# Patient Record
Sex: MALE | Race: OTHER | HISPANIC OR LATINO | URBAN - METROPOLITAN AREA
[De-identification: names, ages, dates, MRNs, and addresses within clinical notes are randomized per-mention and may not be internally consistent; named-entity substitution may affect disease eponyms.]

---

## 2022-07-16 ENCOUNTER — EMERGENCY (EMERGENCY)
Facility: HOSPITAL | Age: 22
LOS: 1 days | Discharge: HOME | End: 2022-07-16
Attending: EMERGENCY MEDICINE
Payer: MEDICAID

## 2022-07-16 VITALS
SYSTOLIC BLOOD PRESSURE: 128 MMHG | TEMPERATURE: 98 F | HEART RATE: 62 BPM | OXYGEN SATURATION: 99 % | DIASTOLIC BLOOD PRESSURE: 76 MMHG | HEIGHT: 68.9 IN | RESPIRATION RATE: 16 BRPM | WEIGHT: 194.01 LBS

## 2022-07-16 PROCEDURE — 73130 X-RAY EXAM OF HAND: CPT

## 2022-07-16 PROCEDURE — 99284 EMERGENCY DEPT VISIT MOD MDM: CPT | Mod: 57

## 2022-07-16 PROCEDURE — 99283 EMERGENCY DEPT VISIT LOW MDM: CPT | Mod: 25

## 2022-07-16 PROCEDURE — 73130 X-RAY EXAM OF HAND: CPT | Mod: 26,LT

## 2022-07-16 PROCEDURE — 26720 TREAT FINGER FRACTURE EACH: CPT | Mod: 54,F4
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
## 2022-07-16 NOTE — ED PROVIDER NOTE - PATIENT PORTAL LINK FT
You can access the FollowMyHealth Patient Portal offered by [facility] by registering at the following website: [email or web address]. By joining [facility] FollowMyHealth portal, you will also be able to view your health information using other applications (apps) compatible with our system.

## 2022-07-16 NOTE — ED PROVIDER NOTE - ATTESTATION, MLM
normal I have reviewed and confirmed nurses' notes for patient's medications, allergies, medical history, and surgical history.
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
## 2022-07-16 NOTE — ED ADULT NURSE NOTE - NSIMPLEMENTINTERV_GEN_ALL_ED
Implemented All Universal Safety Interventions:  [place] to call system. Call bell, personal items and telephone within reach. Instruct patient to call for assistance. Room bathroom lighting operational. Non-slip footwear when patient is off stretcher. Physically safe environment: no spills, clutter or unnecessary equipment. Stretcher in lowest position, wheels locked, appropriate side rails in place.
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
## 2022-12-13 NOTE — ED PROCEDURE NOTE - NS ED PROCEDURE ASSISTED BY
Debridement Text: The wound edges were debrided prior to proceeding with the closure to facilitate wound healing. The procedure was performed independently

## 2023-11-08 NOTE — ED PROVIDER NOTE - SCRIBE NAME
He eats some fruits, veggies, and proteins, but does not limit sugar or carb intake. Discussed trying to decrease sugary drinks like soda and extra snacks throughout the day. He does not drink a lot of water, discussed increasing water intake to >60 oz a day. He does no formal exercise, but works in [employer]. Discussed trying to exercise 30 mins 5x a week. He sleeps well. He does drink a few glasses of wine or [place] tiffany a week. He denies any tobacco or drug use. He is UTD with dentist, but not eye doctor. Advanced care planning document was given and discussed with patient. William Sinha (Scribe)